# Patient Record
Sex: MALE | Race: WHITE | Employment: UNEMPLOYED | URBAN - METROPOLITAN AREA
[De-identification: names, ages, dates, MRNs, and addresses within clinical notes are randomized per-mention and may not be internally consistent; named-entity substitution may affect disease eponyms.]

---
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
## 2022-11-12 ENCOUNTER — HOSPITAL ENCOUNTER (EMERGENCY)
Age: 22
Discharge: HOME | End: 2022-11-12
Attending: EMERGENCY MEDICINE
Payer: MEDICARE

## 2022-11-12 VITALS
RESPIRATION RATE: 18 BRPM | DIASTOLIC BLOOD PRESSURE: 87 MMHG | SYSTOLIC BLOOD PRESSURE: 149 MMHG | OXYGEN SATURATION: 97 % | HEIGHT: 75 IN | HEART RATE: 66 BPM | WEIGHT: 175 LBS | TEMPERATURE: 98.4 F | BODY MASS INDEX: 21.76 KG/M2

## 2022-11-12 DIAGNOSIS — J06.9 VIRAL URI WITH COUGH: Primary | ICD-10-CM

## 2022-11-12 PROCEDURE — 99283 EMERGENCY DEPT VISIT LOW MDM: CPT

## 2022-11-12 RX ORDER — BENZONATATE 100 MG/1
100 CAPSULE ORAL 3 TIMES DAILY PRN
Qty: 30 CAPSULE | Refills: 0 | Status: SHIPPED | OUTPATIENT
Start: 2022-11-12 | End: 2022-11-19

## 2022-11-12 ASSESSMENT — ENCOUNTER SYMPTOMS
FACIAL SWELLING: 0
EYE PAIN: 0
ABDOMINAL PAIN: 0
SHORTNESS OF BREATH: 0
NAUSEA: 0
BACK PAIN: 0
COUGH: 1
CHEST TIGHTNESS: 0
VOMITING: 0
EYE REDNESS: 0

## 2022-11-12 ASSESSMENT — PAIN - FUNCTIONAL ASSESSMENT: PAIN_FUNCTIONAL_ASSESSMENT: NONE - DENIES PAIN

## 2022-11-12 NOTE — ED PROVIDER NOTES
[facility]  ED  Emergency Department Encounter  Emergency Medicine Resident     Pt Chente Rodriguez  MRN: [id number]  [place] 2000  Date of evaluation: 11/12/22  PCP:  No primary care provider on file. CHIEF COMPLAINT       Chief Complaint   Patient presents with    Cough    Nasal Congestion     X1 week       HISTORY OF PRESENT ILLNESS  (Location/Symptom, Timing/Onset, Context/Setting, Quality, Duration, Modifying Factors, Severity.)      Jacinta Orourke is a 25 y.o. male who presents with cough, nasal congestion. Patient denies fever chills, abdominal pain, nausea or vomiting headaches. States that his symptoms started 1 week ago. Patient denies any sick contacts. Patient is concerned that he might have COVID-19 or pneumonia. Patient denies having COVID-vaccine or boosters. Patient states that he did have a negative COVID test at home. Patient states that his symptoms have been constant. Patient states that his symptoms are better when he takes DayQuil and NyQuil worse when he does not. PAST MEDICAL / SURGICAL / SOCIAL / FAMILY HISTORY      has a past medical history of Seasonal allergies. has no past surgical history on file.       Social History     Socioeconomic History    Marital status: Single     Spouse name: Not on file    Number of children: Not on file    Years of education: Not on file    Highest education level: Not on file   Occupational History    Not on file   Tobacco Use    Smoking status: Not on file    Smokeless tobacco: Not on file   Substance and Sexual Activity    Alcohol use: Not on file    Drug use: Not on file    Sexual activity: Not on file   Other Topics Concern    Not on file   Social History Narrative    Not on file     Social Determinants of Health     Financial Resource Strain: Not on file   Food Insecurity: Not on file   Transportation Needs: Not on file   Physical Activity: Not on file   Stress: Not on file   Social Connections: Not on file Intimate Partner Violence: Not on file   Housing Stability: Not on file       No family history on file. Allergies:  Clindamycin/lincomycin    Home Medications:  Prior to Admission medications    Not on File       REVIEW OF SYSTEMS    (2-9 systems for level 4, 10 or more for level 5)      Review of Systems   Constitutional:  Negative for chills, diaphoresis and fatigue. HENT:  Positive for congestion. Negative for dental problem, facial swelling, nosebleeds and tinnitus. Eyes:  Negative for pain, redness and visual disturbance. Respiratory:  Positive for cough. Negative for chest tightness and shortness of breath. Cardiovascular:  Negative for chest pain, palpitations and leg swelling. Gastrointestinal:  Negative for abdominal pain, nausea and vomiting. Genitourinary:  Negative for flank pain, hematuria, penile pain, scrotal swelling and testicular pain. Musculoskeletal:  Negative for back pain, neck pain and neck stiffness. Skin:  Negative for pallor, rash and wound. Neurological:  Negative for syncope, facial asymmetry, weakness, numbness and headaches. PHYSICAL EXAM   (up to 7 for level 4, 8 or more for level 5)      INITIAL VITALS:   BP (!) 153/94   Pulse 73   Temp 98.4 °F (36.9 °C) (Oral)   Resp 18   SpO2 97%     Physical Exam  Constitutional:       Appearance: Normal appearance. He is not ill-appearing or diaphoretic. HENT:      Head: Normocephalic and atraumatic. Eyes:      Extraocular Movements: Extraocular movements intact. Pupils: Pupils are equal, round, and reactive to light. Cardiovascular:      Rate and Rhythm: Normal rate and regular rhythm. Pulses: Normal pulses. Heart sounds: Normal heart sounds. No murmur heard. No friction rub. No gallop. Pulmonary:      Effort: Pulmonary effort is normal.      Breath sounds: Normal breath sounds. No stridor. No rhonchi or rales. Abdominal:      General: Bowel sounds are normal. There is no distension. Palpations: Abdomen is soft. Tenderness: There is no abdominal tenderness. There is no guarding or rebound. Hernia: No hernia is present. Musculoskeletal:         General: No swelling, tenderness, deformity or signs of injury. Normal range of motion. Cervical back: Normal range of motion and neck supple. No tenderness. Skin:     General: Skin is warm and dry. Neurological:      General: No focal deficit present. Mental Status: He is alert and oriented to person, place, and time. Cranial Nerves: No cranial nerve deficit. Sensory: No sensory deficit. Motor: No weakness. Psychiatric:         Mood and Affect: Mood normal.       DIFFERENTIAL  DIAGNOSIS     PLAN (LABS / IMAGING / EKG):  No orders of the defined types were placed in this encounter. MEDICATIONS ORDERED:  No orders of the defined types were placed in this encounter. DDX:   COVID-19, flu, viral syndrome    DIAGNOSTIC RESULTS / EMERGENCY DEPARTMENT COURSE / MDM   LAB RESULTS:  No results found for this visit on 11/12/22. IMPRESSION:   Is a 69-year-old male comes in for cough and nasal congestion. Patient well-appearing on exam nontoxic. Patient's oxygen saturation 97% heart rate is 73. Patient is concerned for possible COVID-19 infection or pneumonia. On exam patient's lungs are clear. Discussed with patient that his treatment would not change if he is positive. Patient is agreement with discharge home. RADIOLOGY:  No orders to display         EKG      All EKG's are interpreted by the Emergency Department Physician who either signs or Co-signs this chart in the absence of a cardiologist.    [address] [place] [address] [place]:           No notes of  Admission Criteria type on file. PROCEDURES:      CONSULTS:  None    CRITICAL CARE:      FINAL IMPRESSION      No diagnosis found. DISPOSITION / PLAN     DISPOSITION        PATIENT REFERRED TO:  No follow-up provider specified.     DISCHARGE MEDICATIONS:  New Prescriptions    No medications on file       Roxi Rothman DO  Emergency Medicine Resident    (Please note that portions of thisnote were completed with a voice recognition program.  Efforts were made to edit the dictations but occasionally words are mis-transcribed.)       Roxi Rothman DO  Resident  11/12/22 5786

## 2022-11-12 NOTE — ED PROVIDER NOTES
[facility] [address] [facility]     Emergency Department     Faculty Attestation    I performed a history and physical examination of the patient and discussed management with the resident. I reviewed the residents note and agree with the documented findings and plan of care. Any areas of disagreement are noted on the chart. I was personally present for the key portions of any procedures. I have documented in the chart those procedures where I was not present during the key portions. I have reviewed the emergency nurses triage note. I agree with the chief complaint, past medical history, past surgical history, allergies, medications, social and family history as documented unless otherwise noted below. For Physician Assistant/ Nurse Practitioner cases/documentation I have personally evaluated this patient and have completed at least one if not all key elements of the E/M (history, physical exam, and MDM). Additional findings are as noted. URI cough congestion for the last week. No known sick contacts. No fevers no vomiting no diarrhea. On exam nontoxic well-appearing not hypoxic speaking full sentences lungs clear and equal throughout heart regular.   URI will discharge      Critical Care     none    Chris Little MD, Micheal Fritz  Attending Emergency  Physician           Chris Little MD  11/12/22 [phone]

## 2022-11-12 NOTE — ED NOTES
Pt to ED complaining of cough and nasal congestion for about a weel now. Pt states his been taking OTC medication but it's not helping him at all. VS taken and recorded. Denies any medical history. All needs attended. Call light w/in reach. Will continue plan of care.      Warden Bailey, HO  11/12/22 [id number]

## 2022-12-07 ENCOUNTER — APPOINTMENT (OUTPATIENT)
Dept: GENERAL RADIOLOGY | Age: 22
End: 2022-12-07
Payer: MEDICARE

## 2022-12-07 ENCOUNTER — HOSPITAL ENCOUNTER (EMERGENCY)
Age: 22
Discharge: HOME | End: 2022-12-07
Attending: EMERGENCY MEDICINE
Payer: MEDICARE

## 2022-12-07 VITALS
TEMPERATURE: 97.4 F | DIASTOLIC BLOOD PRESSURE: 100 MMHG | OXYGEN SATURATION: 100 % | HEART RATE: 76 BPM | RESPIRATION RATE: 17 BRPM | SYSTOLIC BLOOD PRESSURE: 159 MMHG | BODY MASS INDEX: 21.87 KG/M2 | WEIGHT: 175 LBS

## 2022-12-07 DIAGNOSIS — S69.90XA JAMMED FINGER (INTERPHALANGEAL JOINT), INITIAL ENCOUNTER: Primary | ICD-10-CM

## 2022-12-07 PROCEDURE — 6370000000 HC RX 637 (ALT 250 FOR IP): Performed by: STUDENT IN AN ORGANIZED HEALTH CARE EDUCATION/TRAINING PROGRAM

## 2022-12-07 PROCEDURE — 99283 EMERGENCY DEPT VISIT LOW MDM: CPT

## 2022-12-07 PROCEDURE — 73130 X-RAY EXAM OF HAND: CPT

## 2022-12-07 RX ORDER — IBUPROFEN 800 MG/1
800 TABLET ORAL ONCE
Status: COMPLETED | OUTPATIENT
Start: 2022-12-07 | End: 2022-12-07

## 2022-12-07 RX ADMIN — IBUPROFEN 800 MG: 800 TABLET, FILM COATED ORAL at 17:32

## 2022-12-07 ASSESSMENT — PAIN SCALES - GENERAL: PAINLEVEL_OUTOF10: 8

## 2022-12-07 ASSESSMENT — ENCOUNTER SYMPTOMS
COUGH: 0
VOICE CHANGE: 0
TROUBLE SWALLOWING: 0
EYES NEGATIVE: 1

## 2022-12-07 ASSESSMENT — PAIN - FUNCTIONAL ASSESSMENT: PAIN_FUNCTIONAL_ASSESSMENT: 0-10

## 2022-12-07 NOTE — ED NOTES
PT to ER for c/o L pointer finger swelling and pain for 3 weeks. Pt states he was playing basketball and had the basketball jammed into his finger a \"few times. \" Pt states that he has been taking motrin for the pain but states he did not take anything today. Pt states he has been avoiding coming to the ER because he thought it would heal on its own but the swelling and pain has not gone away. Pt thinks he has a finger dislocation. Pt noted to have swelling in the L pointer finger with limited movement. No acute distress noted, RR even and NL. Dr. Pedro Fears at bedside to evaluate pt.       Gus Sexton RN  12/07/22 8421

## 2022-12-07 NOTE — DISCHARGE INSTRUCTIONS
Thank you for visiting [facility] Emergency Department. You need to call No primary care provider on file. to make an appointment as directed for follow up. Should you have any questions regarding your care or further treatment, please call [facility] Emergency Department at [phone].

## 2022-12-07 NOTE — ED PROVIDER NOTES
[facility]  ED  Emergency Department Encounter  Emergency Medicine Resident     Pt Name: Nu Khalil  AAM:[id number]  [place] 2000  Date of evaluation: 12/7/22  PCP:  No primary care provider on file. CHIEF COMPLAINT       Chief Complaint   Patient presents with    Finger Pain     L pointer finger        HISTORY OF PRESENT ILLNESS  (Location/Symptom, Timing/Onset, Context/Setting, Quality, Duration, ModifyingFactors, Severity.)      Nu Khalil is a 25 y.o. male presents to the emerge apartment for evaluation of left pointer finger injury. Patient was playing basketball and jammed multiple times while playing. He states that he has different with flexion of his finger however extension is maintained. Patient states that his pain is a 7 out of 10 but it comes and 8 out of 10 when he tries to move it. Patient denies any popping or bending noises. Patient is right-hand dominant this is on his left hand. Patient states is never broken a bone before. PAST MEDICAL / SURGICAL / SOCIAL /FAMILY HISTORY      has a past medical history of Seasonal allergies. No other pertinent PMH on review with patient/guardian. has no past surgical history on file. No other pertinent PSH on review with patient/guardian.   Social History     Socioeconomic History    Marital status: Single     Spouse name: Not on file    Number of children: Not on file    Years of education: Not on file    Highest education level: Not on file   Occupational History    Not on file   Tobacco Use    Smoking status: Not on file    Smokeless tobacco: Not on file   Substance and Sexual Activity    Alcohol use: Not on file    Drug use: Not on file    Sexual activity: Not on file   Other Topics Concern    Not on file   Social History Narrative    Not on file     Social Determinants of Health     Financial Resource Strain: Not on file   Food Insecurity: Not on file   Transportation Needs: Not on file   Physical Activity: Not on file   Stress: Not on file   Social Connections: Not on file   Intimate Partner Violence: Not on file   Housing Stability: Not on file       I counseled the patient against using tobacco products. History reviewed. No pertinent family history. No other pertinent FamHx on review with patient/guardian. Allergies:  Clindamycin/lincomycin    Home Medications:  Prior to Admission medications    Not on File       REVIEW OF SYSTEMS    (2-9 systems for level 4, 10 ormore for level 5)      Review of Systems   Constitutional:  Negative for activity change, appetite change and fever. HENT:  Negative for congestion, trouble swallowing and voice change. Eyes: Negative. Respiratory:  Negative for cough. Musculoskeletal:  Positive for arthralgias. Negative for myalgias. Allergic/Immunologic: Negative for immunocompromised state. Neurological:  Negative for dizziness and headaches. Psychiatric/Behavioral:  Negative for agitation, behavioral problems and confusion. PHYSICAL EXAM   (up to 7 for level 4, 8 or more for level 5)      INITIAL VITALS:   BP (!) 159/100   Pulse 76   Temp 97.4 °F (36.3 °C) (Oral)   Resp 17   Wt 175 lb (79.4 kg)   SpO2 100%   BMI 21.87 kg/m²     Physical Exam  Vitals reviewed. Constitutional:       Appearance: Normal appearance. HENT:      Head: Normocephalic and atraumatic. Nose: Nose normal.      Mouth/Throat:      Mouth: Mucous membranes are moist.      Pharynx: Oropharynx is clear. Eyes:      Extraocular Movements: Extraocular movements intact. Conjunctiva/sclera: Conjunctivae normal.      Pupils: Pupils are equal, round, and reactive to light. Cardiovascular:      Rate and Rhythm: Normal rate and regular rhythm. Pulses: Normal pulses. Heart sounds: Normal heart sounds. Pulmonary:      Effort: Pulmonary effort is normal.      Breath sounds: Normal breath sounds.    Musculoskeletal:      Cervical back: Normal range of motion and neck supple. Comments: Moderately swollen and tender left pointer finger. Range of motion maintained save for difficulty with full flexion secondary to swelling. Skin:     General: Skin is warm and dry. Capillary Refill: Capillary refill takes less than 2 seconds. Neurological:      General: No focal deficit present. Mental Status: He is alert. Mental status is at baseline. Psychiatric:         Mood and Affect: Mood normal.       DIFFERENTIAL  DIAGNOSIS     DDX: Jammed finger, sprain finger, finger fracture    PLAN (LABS / IMAGING / EKG):  Orders Placed This Encounter   Procedures    XR HAND LEFT (MIN 3 VIEWS)       MEDICATIONS ORDERED:  Orders Placed This Encounter   Medications    ibuprofen (ADVIL;MOTRIN) tablet 800 mg           DIAGNOSTIC RESULTS / EMERGENCY DEPARTMENT COURSE / MDM     LABS:  No results found for this visit on 12/07/22. IMPRESSION/MDM/ED COURSE:  25 y.o. male presented with possible finger fracture secondary to a basketball hitting the tip. Will get x-rays of finger to rule out mallet fracture versus dislocation. Will pain control with Motrin and reevaluate after scans are returned. ED Course as of 12/07/22 1854   Wed Dec 07, 2022   1853 Patient x-rays are negative. Swelling is coming down appropriately. Pain controlled with Motrin. Will discharge at this time. [ES]      ED Course User Index  [ES] Conrad Whiting MD       Patient/Guardian requesting discharge. Patient/Guardian was given written and verbal instructions prior to discharge. Patient/Guardian understood and agreed. Patient/Guardian had no further questions. RADIOLOGY:  XR HAND LEFT (MIN 3 VIEWS)   Final Result   Soft tissue swelling of the 2nd digit with no evident fracture or   dislocation. Follow-up radiographs could be considered if there are   persistent symptoms.                EKG  None    All EKG's are interpreted by the Emergency Department Physician who either signs or Co-signs this chart in the absence of a cardiologist.      PROCEDURES:  None    CONSULTS:  None        FINAL IMPRESSION      1.  Jammed finger (interphalangeal joint), initial encounter          DISPOSITION / PLAN       DISPOSITION Decision To Discharge 12/07/2022 06:53:52 PM        PATIENT REFERREDTO:  [facility]  [address] [place] [address]  [phone]  Go to   If symptoms worsen    DISCHARGE MEDICATIONS:  New Prescriptions    No medications on file       Delaney Stuart MD  PGY 3  Resident Physician Emergency Medicine  12/07/22 6:54 PM        (Please note that portions of this note were completed with a voice recognition program.Efforts were made to edit the dictations but occasionally words are mis-transcribed.)       Delaney Stuart MD  Resident  12/07/22 3144

## 2022-12-09 NOTE — ED PROVIDER NOTES
[facility]     Emergency Department     Faculty Attestation    I performed a history and physical examination of the patient and discussed management with the resident. I reviewed the residents note and agree with the documented findings and plan of care. Any areas of disagreement are noted on the chart. I was personally present for the key portions of any procedures. I have documented in the chart those procedures where I was not present during the key portions. I have reviewed the emergency nurses triage note. I agree with the chief complaint, past medical history, past surgical history, allergies, medications, social and family history as documented unless otherwise noted below. For Physician Assistant/ Nurse Practitioner cases/documentation I have personally evaluated this patient and have completed at least one if not all key elements of the E/M (history, physical exam, and MDM). Additional findings are as noted. I have personally seen and evaluated the patient. I find the patient's history and physical exam are consistent with the NP/PA documentation. I agree with the care provided, treatment rendered, disposition and follow-up plan. Critical Care     Shari Warner M.D.   Attending Emergency  Physician            Seema Marquez MD  12/09/22 3409

## 2023-01-30 ENCOUNTER — HOSPITAL ENCOUNTER (EMERGENCY)
Age: 23
Discharge: HOME | End: 2023-01-30
Attending: EMERGENCY MEDICINE
Payer: MEDICARE

## 2023-01-30 ENCOUNTER — APPOINTMENT (OUTPATIENT)
Dept: GENERAL RADIOLOGY | Age: 23
End: 2023-01-30
Payer: MEDICARE

## 2023-01-30 VITALS
DIASTOLIC BLOOD PRESSURE: 91 MMHG | RESPIRATION RATE: 18 BRPM | HEART RATE: 82 BPM | BODY MASS INDEX: 21.14 KG/M2 | SYSTOLIC BLOOD PRESSURE: 162 MMHG | WEIGHT: 170 LBS | HEIGHT: 75 IN | TEMPERATURE: 97.2 F | OXYGEN SATURATION: 99 %

## 2023-01-30 DIAGNOSIS — M25.442 FINGER JOINT SWELLING, LEFT: Primary | ICD-10-CM

## 2023-01-30 DIAGNOSIS — M25.572 ACUTE LEFT ANKLE PAIN: ICD-10-CM

## 2023-01-30 PROCEDURE — 73610 X-RAY EXAM OF ANKLE: CPT

## 2023-01-30 PROCEDURE — 73130 X-RAY EXAM OF HAND: CPT

## 2023-01-30 PROCEDURE — 6370000000 HC RX 637 (ALT 250 FOR IP)

## 2023-01-30 PROCEDURE — 99283 EMERGENCY DEPT VISIT LOW MDM: CPT

## 2023-01-30 RX ORDER — IBUPROFEN 800 MG/1
800 TABLET ORAL 2 TIMES DAILY PRN
Qty: 15 TABLET | Refills: 0 | Status: SHIPPED | OUTPATIENT
Start: 2023-01-30

## 2023-01-30 RX ORDER — ACETAMINOPHEN 500 MG
1000 TABLET ORAL ONCE
Status: COMPLETED | OUTPATIENT
Start: 2023-01-30 | End: 2023-01-30

## 2023-01-30 RX ADMIN — ACETAMINOPHEN 1000 MG: 500 TABLET ORAL at 20:57

## 2023-01-30 ASSESSMENT — PAIN SCALES - GENERAL: PAINLEVEL_OUTOF10: 6

## 2023-01-30 ASSESSMENT — PAIN - FUNCTIONAL ASSESSMENT: PAIN_FUNCTIONAL_ASSESSMENT: 0-10

## 2023-01-30 ASSESSMENT — PAIN DESCRIPTION - LOCATION: LOCATION: ANKLE

## 2023-01-30 ASSESSMENT — PAIN DESCRIPTION - DESCRIPTORS: DESCRIPTORS: SHARP;ACHING

## 2023-01-30 ASSESSMENT — PAIN DESCRIPTION - ORIENTATION: ORIENTATION: LEFT

## 2023-01-30 NOTE — Clinical Note
Lubna Cleary was seen and treated in our emergency department on 1/30/2023. He may return to work on 01/31/2023. If you have any questions or concerns, please don't hesitate to call.       Leyla Gomez MD

## 2023-01-30 NOTE — Clinical Note
Catherine Carlson was seen and treated in our emergency department on 1/30/2023. He may return to work on . If you have any questions or concerns, please don't hesitate to call.       Shania Ferrell MD

## 2023-01-31 NOTE — ED PROVIDER NOTES
[address] [facility]     Emergency Department     Faculty Attestation    I performed a history and physical examination of the patient and discussed management with the resident. I reviewed the residents note and agree with the documented findings and plan of care. Any areas of disagreement are noted on the chart. I was personally present for the key portions of any procedures. I have documented in the chart those procedures where I was not present during the key portions. I have reviewed the emergency nurses triage note. I agree with the chief complaint, past medical history, past surgical history, allergies, medications, social and family history as documented unless otherwise noted below. For Physician Assistant/ Nurse Practitioner cases/documentation I have personally evaluated this patient and have completed at least one if not all key elements of the E/M (history, physical exam, and MDM). Additional findings are as noted. Patient here with 2 orthopedic complaints both from basketball. 1.  Persistent pain in his right index finger. Seen for same 6 weeks ago negative x-rays at that time but swelling present recommended repeat x-rays with persistent symptoms. No new injury. Patient has swelling pain at the left index finger PIP but no deformity full range of motion despite discomfort, There is full strength for flexion and extension against full resistance at the MCP, PIP, and the DIP in the affected digit. Will image. 2.  Left ankle pain basketball injury still ambulatory.   Lateral pain and swelling on exam no proximal fibula tenderness no open injury strong pulses will image        Critical Care     none    Willean Favre, MD, Janeth Bateman  Attending Emergency  Physician            Willean Favre, MD  01/30/23 5300

## 2023-01-31 NOTE — ED PROVIDER NOTES
[facility]  ED  Emergency Department Encounter  Emergency Medicine Resident     Pt Martine Hernandez  MRN: [id number]  [place] 2000  Date of evaluation: 1/30/23  PCP:  No primary care provider on file. Note Started: 9:35 PM EST      CHIEF COMPLAINT       Chief Complaint   Patient presents with    Ankle Pain     Left ankle 1 month ago     Finger Pain     Left hand   started x2 months ago     HISTORY OF PRESENT ILLNESS  (Location/Symptom, Timing/Onset, Context/Setting, Quality, Duration, Modifying Factors, Severity.)      Elier Waters is a 25 y.o. male who presents with left middle finger pain and left ankle pain. Patient was evaluated in the emergency department for his left middle finger back in December. He injured it playing basketball. X-ray at that time did not show any acute fracture. Patient currently denies any pain in his left finger. However, the swelling has never gone down. Patient also has left ankle pain. He twisted it about a month ago playing basketball. He was elevating it and applying ice but the swelling has not improved he works 6 to 8 hours on his feet and has been having pain when standing. PAST MEDICAL / SURGICAL / SOCIAL / FAMILY HISTORY      has a past medical history of Seasonal allergies. Reviewed with patient. has no past surgical history on file. Reviewed with patient.     Social History     Socioeconomic History    Marital status: Single     Spouse name: Not on file    Number of children: Not on file    Years of education: Not on file    Highest education level: Not on file   Occupational History    Not on file   Tobacco Use    Smoking status: Not on file    Smokeless tobacco: Not on file   Substance and Sexual Activity    Alcohol use: Not on file    Drug use: Not on file    Sexual activity: Not on file   Other Topics Concern    Not on file   Social History Narrative    Not on file     Social Determinants of Health     Financial Resource Strain: Not on file   Food Insecurity: Not on file   Transportation Needs: Not on file   Physical Activity: Not on file   Stress: Not on file   Social Connections: Not on file   Intimate Partner Violence: Not on file   Housing Stability: Not on file       No family history on file.    Allergies:  Clindamycin/lincomycin    Home Medications:  Prior to Admission medications    Not on File       REVIEW OF SYSTEMS       Review of Systems   Musculoskeletal:  Positive for joint swelling. Negative for gait problem.        Left middle finger swelling.  Left ankle pain and swelling.   Skin:  Negative for wound.   Neurological:  Negative for weakness and numbness.   Hematological:  Does not bruise/bleed easily.     PHYSICAL EXAM      INITIAL VITALS:   BP (!) 162/91   Pulse 82   Temp 97.2 °F (36.2 °C) (Oral)   Resp 18   Ht 6' 3\" (1.905 m)   Wt 170 lb (77.1 kg)   SpO2 99%   BMI 21.25 kg/m²     Physical Exam  Constitutional:       General: He is not in acute distress.  HENT:      Head: Normocephalic and atraumatic.      Right Ear: External ear normal.      Left Ear: External ear normal.      Nose: Nose normal.      Mouth/Throat:      Mouth: Mucous membranes are moist.   Eyes:      Extraocular Movements: Extraocular movements intact.      Conjunctiva/sclera: Conjunctivae normal.   Cardiovascular:      Rate and Rhythm: Normal rate.      Pulses: Normal pulses.      Comments: DP pulses 2+ bilaterally.  Pulmonary:      Effort: Pulmonary effort is normal.   Musculoskeletal:         General: Swelling and tenderness present. Normal range of motion.      Cervical back: Normal range of motion.      Comments: Swelling noted to the left PIP joint of the middle finger.  No tenderness to palpation.  Full range of motion of the left hand.  Swelling noted to the left ankle.  Tenderness to the lateral malleolus.  No decreased range of motion of the left ankle.   Skin:     Findings: No bruising, erythema, lesion or rash.   Neurological:       General: No focal deficit present. Mental Status: He is alert and oriented to person, place, and time. Cranial Nerves: No cranial nerve deficit. Sensory: No sensory deficit. Motor: No weakness. Coordination: Coordination normal.      Gait: Gait normal.     DDX/DIAGNOSTIC RESULTS / EMERGENCY DEPARTMENT COURSE / MDM     Medical Decision Making  71-year-old male who presents with swelling to his left middle finger and pain and swelling to his left ankle. Patient injured his left middle finger back in December playing basketball. X-ray at that time showed no acute fracture. However, the swelling has not improved. Patient twisted his ankle playing basketball about a month ago. Patient still has swelling and pain when standing on the left ankle. Patient is not in acute distress. Vital signs stable. Swelling noted to the PIP joint of the left middle finger. No tenderness to palpation. Full range of motion of the left hand. No sensation deficit. Swelling noted to the lateral aspect of the left ankle. Tenderness to the lateral malleolus. No decreased range of motion of the left ankle. No sensation deficit. Strength 5/5 in bilateral lower extremities. DP pulses 2+ bilaterally. Patient able to ambulate. Will order x-ray of left hand and left ankle. Pain control. Anticipate discharge. Amount and/or Complexity of Data Reviewed  External Data Reviewed: radiology and notes. Radiology: ordered. Decision-making details documented in ED Course. Risk  OTC drugs. Critical Care  Total time providing critical care: < 30 minutes    EMERGENCY DEPARTMENT COURSE:  ED Course as of 01/30/23 2154 Mon Jan 30, 2023 2153 Signed out to Dr. Christiane James. [KR]      ED Course User Index  [KR] Nurys Gillespie MD       CONSULTS:  None    CRITICAL CARE:  There was significant risk of life threatening deterioration of patient's condition requiring my direct management.  Critical care time 10 minutes, excluding any documented procedures. FINAL IMPRESSION      1. Finger joint swelling, left    2. Acute left ankle pain          DISPOSITION / PLAN     DISPOSITION        PATIENT REFERRED TO:  No follow-up provider specified.     DISCHARGE MEDICATIONS:  New Prescriptions    No medications on file       Sari Chong MD  Emergency Medicine Resident    (Please note that portions of thisnote were completed with a voice recognition program.  Efforts were made to edit the dictations but occasionally words are mis-transcribed.)       Sari Chong MD  Resident  01/30/23 5742

## 2023-01-31 NOTE — ED PROVIDER NOTES
[facility] ED  Emergency Department  Emergency Medicine Resident Sign-out     Care of Kobe Zavala was assumed from Dr. Souza and is being seen for Ankle Pain (Left ankle 1 month ago ) and Finger Pain (Left hand   started x2 months ago)  .  The patient's initial evaluation and plan have been discussed with the prior provider who initially evaluated the patient.     EMERGENCY DEPARTMENT COURSE / MEDICAL DECISION MAKING:       MEDICATIONS GIVEN:  Orders Placed This Encounter   Medications    acetaminophen (TYLENOL) tablet 1,000 mg    ibuprofen (ADVIL;MOTRIN) 800 MG tablet     Sig: Take 1 tablet by mouth 2 times daily as needed for Pain     Dispense:  15 tablet     Refill:  0       LABS / RADIOLOGY:     Labs Reviewed - No data to display    No results found.    RECENT VITALS:     Temp: 97.2 °F (36.2 °C),  Heart Rate: 82, Resp: 18, BP: (!) 162/91, SpO2: 99 %      This patient is a 22 y.o. Male with hand pain and ankle pain after injury.  Patient with swelling over the hand.  Plain films ordered.        ED Course as of 01/31/23 0146 Mon Jan 30, 2023 2153 Signed out to Dr. Morales. [KR]   2210 XR reviewed my me, no acute fractures noted. Still awaiting for read by radiologist [AN]   2300 XR showed Worsening soft tissue swelling along the ulnar aspect of 2nd digit PIP joint  with no radiopaque foreign body.  Finding may be related to tendon/ligamentous injury.  For further evaluation MRI can be performed [AN]      ED Course User Index  [AN] Marylu Morales MD  [KR] Amarjit Souza MD       OUTSTANDING TASKS / RECOMMENDATIONS:         FINAL IMPRESSION:     1. Finger joint swelling, left    2. Acute left ankle pain        DISPOSITION:         DISPOSITION:  [x]  Discharge   []  Transfer -    []  Admission -     []  Against Medical Advice   []  Eloped   FOLLOW-UP: [facility] ED  [address] [address]  [phone]    If symptoms worsen    Cornell Dejesus DO  [address]269  [address] [place] [address]  [phone]      Please call to follow up   DISCHARGE MEDICATIONS: Discharge Medication List as of 1/30/2023 11:12 PM        START taking these medications    Details   ibuprofen (ADVIL;MOTRIN) 800 MG tablet Take 1 tablet by mouth 2 times daily as needed for Pain, Disp-15 tablet, R-0Print                Assumpta Eros Avelar MD  Emergency Medicine Resident  [facility]        Joao Carreno MD  Resident  01/31/23 6961

## 2023-01-31 NOTE — DISCHARGE INSTRUCTIONS
Due to concern for ligament/tendon injury, you will need to follow-up with orthopedic surgeon for them to schedule for an MRI. If it anytime you do have worsening symptoms pain, swelling, redness, please return to emergency room as soon as possible. XR HAND LEFT (MIN 3 VIEWS)   Final Result   Left ankle: No acute osseous abnormality. Left hand: No acute osseous abnormality. Worsening soft tissue swelling along the ulnar aspect of 2nd digit PIP joint   with no radiopaque foreign body. Finding may be related to   tendon/ligamentous injury. For further evaluation MRI can be performed. XR ANKLE LEFT (MIN 3 VIEWS)   Final Result   Left ankle: No acute osseous abnormality. Left hand: No acute osseous abnormality. Worsening soft tissue swelling along the ulnar aspect of 2nd digit PIP joint   with no radiopaque foreign body. Finding may be related to   tendon/ligamentous injury. For further evaluation MRI can be performed.

## 2023-01-31 NOTE — ED NOTES
Pt came to ED via triage w complaint of L ankle pain/ swelling over 1 month, L finger swelling over 2 months. Was tx here for finger pain/swelling over 2 months ago but hasnt gotten better. Injured both while playing basketball. Has continued to play basketball like normal after injuries, pt admits not letting ankle and finger heal. No meds taken PTA. Pt denies medical hx related to condition. VSS, NAD, AOx4.  Pt resting in bed with call light in reach no verbalized needs at this time        Queta Sagastume RN  01/30/23 2032

## 2023-01-31 NOTE — PROGRESS NOTES
[facility] [address]  PROGRESS NOTE    Shift date: 1.30.2023  Shift day: Monday   Shift # 2    Room # 11/11   Name: Mt Skelton                Anabaptism: unknown   Place of Hindu: unknown    Referral: Routine Visit    Admit Date & Time: 1/30/2023  8:24 PM    Assessment:  Mt Skelton is a 25 y.o. male in the hospital because for pain. Upon entering the room writer observes patient walking around, calm, and coping. Patient states that family is aware that he is in the hospital and has support. Intervention:  Writer introduced self and title as  Writer offered space for the patient  to express feelings, needs, and concerns and provided a ministry presence. Outcome:  Patient appears to be calm and coping    Plan:  Chaplains will remain available to offer spiritual and emotional support as needed.       Electronically signed by Ruben Stanton on 1/30/2023 at 11:06 PM.  [facility]  [phone]       01/30/23 2250   Encounter Summary   Service Provided For: Patient   Referral/Consult From: [facility] Family members   Last Encounter  01/30/23   Complexity of Encounter Low   Begin Time 2250   End Time  2255   Total Time Calculated 5 min   Encounter    Type Initial Screen/Assessment   Assessment/Intervention/Outcome   Assessment Calm;Coping   Intervention Active listening;Explored/Affirmed feelings, thoughts, concerns   Outcome Coping     Electronically signed by Kevyn Jolly on 1/30/2023 at 11:06 PM

## 2023-02-06 ENCOUNTER — HOSPITAL ENCOUNTER (EMERGENCY)
Age: 23
Discharge: HOME | End: 2023-02-06
Attending: EMERGENCY MEDICINE
Payer: COMMERCIAL

## 2023-02-06 VITALS
OXYGEN SATURATION: 99 % | HEART RATE: 85 BPM | DIASTOLIC BLOOD PRESSURE: 87 MMHG | SYSTOLIC BLOOD PRESSURE: 147 MMHG | RESPIRATION RATE: 18 BRPM | TEMPERATURE: 98.2 F | BODY MASS INDEX: 21.76 KG/M2 | WEIGHT: 175 LBS | HEIGHT: 75 IN

## 2023-02-06 DIAGNOSIS — S31.119A LACERATION OF ABDOMEN, INITIAL ENCOUNTER: Primary | ICD-10-CM

## 2023-02-06 PROCEDURE — 90471 IMMUNIZATION ADMIN: CPT | Performed by: EMERGENCY MEDICINE

## 2023-02-06 PROCEDURE — 90715 TDAP VACCINE 7 YRS/> IM: CPT | Performed by: EMERGENCY MEDICINE

## 2023-02-06 PROCEDURE — 6360000002 HC RX W HCPCS: Performed by: EMERGENCY MEDICINE

## 2023-02-06 PROCEDURE — 99284 EMERGENCY DEPT VISIT MOD MDM: CPT

## 2023-02-06 PROCEDURE — 12002 RPR S/N/AX/GEN/TRNK2.6-7.5CM: CPT

## 2023-02-06 PROCEDURE — 96372 THER/PROPH/DIAG INJ SC/IM: CPT

## 2023-02-06 RX ORDER — KETOROLAC TROMETHAMINE 30 MG/ML
30 INJECTION, SOLUTION INTRAMUSCULAR; INTRAVENOUS ONCE
Status: COMPLETED | OUTPATIENT
Start: 2023-02-06 | End: 2023-02-06

## 2023-02-06 RX ADMIN — TETANUS TOXOID, REDUCED DIPHTHERIA TOXOID AND ACELLULAR PERTUSSIS VACCINE, ADSORBED 0.5 ML: 5; 2.5; 8; 8; 2.5 SUSPENSION INTRAMUSCULAR at 22:42

## 2023-02-06 RX ADMIN — KETOROLAC TROMETHAMINE 30 MG: 30 INJECTION, SOLUTION INTRAMUSCULAR; INTRAVENOUS at 22:41

## 2023-02-06 ASSESSMENT — PAIN DESCRIPTION - ORIENTATION: ORIENTATION: LEFT;LOWER

## 2023-02-06 ASSESSMENT — PAIN - FUNCTIONAL ASSESSMENT
PAIN_FUNCTIONAL_ASSESSMENT: NONE - DENIES PAIN
PAIN_FUNCTIONAL_ASSESSMENT: 0-10

## 2023-02-06 ASSESSMENT — ENCOUNTER SYMPTOMS
NAUSEA: 0
VOMITING: 0
ABDOMINAL PAIN: 1
BACK PAIN: 0

## 2023-02-06 ASSESSMENT — PAIN DESCRIPTION - LOCATION: LOCATION: ABDOMEN

## 2023-02-06 ASSESSMENT — PAIN SCALES - GENERAL: PAINLEVEL_OUTOF10: 8

## 2023-02-07 ENCOUNTER — HOSPITAL ENCOUNTER (EMERGENCY)
Age: 23
Discharge: HOME | End: 2023-02-07
Attending: EMERGENCY MEDICINE
Payer: MEDICAID

## 2023-02-07 VITALS
WEIGHT: 170 LBS | TEMPERATURE: 96.8 F | HEART RATE: 72 BPM | HEIGHT: 75 IN | RESPIRATION RATE: 16 BRPM | SYSTOLIC BLOOD PRESSURE: 150 MMHG | OXYGEN SATURATION: 100 % | DIASTOLIC BLOOD PRESSURE: 98 MMHG | BODY MASS INDEX: 21.14 KG/M2

## 2023-02-07 DIAGNOSIS — S31.119A LACERATION OF ABDOMEN, INITIAL ENCOUNTER: Primary | ICD-10-CM

## 2023-02-07 PROCEDURE — 99282 EMERGENCY DEPT VISIT SF MDM: CPT

## 2023-02-07 PROCEDURE — 12001 RPR S/N/AX/GEN/TRNK 2.5CM/<: CPT

## 2023-02-07 ASSESSMENT — PAIN DESCRIPTION - LOCATION: LOCATION: ABDOMEN

## 2023-02-07 ASSESSMENT — PAIN - FUNCTIONAL ASSESSMENT: PAIN_FUNCTIONAL_ASSESSMENT: 0-10

## 2023-02-07 ASSESSMENT — PAIN SCALES - GENERAL: PAINLEVEL_OUTOF10: 7

## 2023-02-07 NOTE — ED PROVIDER NOTES
[facility]     Emergency Department     Faculty Attestation    I performed a history and physical examination of the patient and discussed management with the resident. I reviewed the residents note and agree with the documented findings and plan of care. Any areas of disagreement are noted on the chart. I was personally present for the key portions of any procedures. I have documented in the chart those procedures where I was not present during the key portions. I have reviewed the emergency nurses triage note. I agree with the chief complaint, past medical history, past surgical history, allergies, medications, social and family history as documented unless otherwise noted below. For Physician Assistant/ Nurse Practitioner cases/documentation I have personally evaluated this patient and have completed at least one if not all key elements of the E/M (history, physical exam, and MDM). Additional findings are as noted. I have personally seen and evaluated the patient. I find the patient's history and physical exam are consistent with the NP/PA documentation. I agree with the care provided, treatment rendered, disposition and follow-up plan. Otherwise healthy 26-year-old male presenting with laceration to the left lower quadrant. Patient was trying to move a heavy coil of wire in the end of it cut his abdomen. No other injuries. No history of bleeding disorders. Exam:  General : Laying on the bed, awake, alert, and in no acute distress  CV : normal rate and regular rhythm  Lungs : Breathing comfortably on room air with no tachypnea, hypoxia, or increased work of breathing  Abdomen : Superficial laceration over the left lower quadrant. Does not extend into subcutaneous fat. Plan:  Laceration repaired primarily with tissue adhesive. Wound cleaned and probed, does not violate the peritoneum.   Follow-up with occupational health as needed    Medical Decision Making  Risk  Prescription drug management.         Kinjal Vazquez MD   Attending Emergency Physician    (Please note that portions of this note were completed with a voice recognition program. Efforts were made to edit the dictations but occasionally words are mis-transcribed.)            Kinjal Vazquez MD  02/06/23 3306

## 2023-02-07 NOTE — PROGRESS NOTES
[address] [facility] [address]  PROGRESS NOTE    Shift date: 2/6/23  Shift day: Monday   Shift # 2    Room # 23/23   Name: Kristin Hankins                Yazdanism:  [address] [place] [address] of Moravian:     Referral: Routine Visit    Admit Date & Time: 2/6/2023 10:20 PM    Assessment:  Kristin Hankins is a 25 y.o. male     Intervention:  Writer introduced self and title as . Patient appeared receptive to  visit and engaged in conversation. Patient discussed his work day and the reason for his hospital visit. Patient appeared calm and coping when recapping his day. Writer offered space for Patient  to express feelings, needs, and concerns and provided a ministry presence. Patient stated he is of [place] virgie. Outcome:  Patient thanked  for visit while processing the days events. Plan:  Chaplains will remain available to offer spiritual and emotional support as needed.       Electronically signed by Cherie Randhawa on 2/7/2023 at 12:23 AM.  [facility]  [phone]

## 2023-02-07 NOTE — ED NOTES
Pt arrives to ED 03 via triage. Pt co laceration opening up again. Pt states he was here yesterday and they glued his abdomen, but it keeps bleeding. Pt states the bleeding has not stopped since last night, so he continues to keep pressure on it. Pt respirations are even and unlabored, pt is alert and oriented X 4, speaking in complete sentences, bed is in the lowest position, call light is within reach, NAD noted. Will continue to follow plan of care.         Michelle Fung RN  02/07/23 3813

## 2023-02-07 NOTE — ED PROVIDER NOTES
[facility]  Emergency Department  Faculty Attestation     I performed a history and physical examination of the patient and discussed management with the resident. I reviewed the residents note and agree with the documented findings and plan of care. Any areas of disagreement are noted on the chart. I was personally present for the key portions of any procedures. I have documented in the chart those procedures where I was not present during the key portions. I have reviewed the emergency nurses triage note. I agree with the chief complaint, past medical history, past surgical history, allergies, medications, social and family history as documented unless otherwise noted below. For Physician Assistant/ Nurse Practitioner cases/documentation I have personally evaluated this patient and have completed at least one if not all key elements of the E/M (history, physical exam, and MDM). Additional findings are as noted. Primary Care Physician:  No primary care provider on file. Screenings:  [unfilled]    CHIEF COMPLAINT     No chief complaint on file. RECENT VITALS:   Temp: 96.8 °F (36 °C),  Heart Rate: 72, Resp: 16, BP: (!) 150/98    LABS:  Labs Reviewed - No data to display    Radiology  No orders to display       Attending Physician Additional  Notes    Patient has persistent bleeding from his left lower abdominal wound that was closed with Dermabond yesterday. He states he was cut by a coil from an object at work. No deep abdominal pain. No use of anticoagulation. No trauma since he was repaired yesterday. He was given a tetanus shot. On exam he is nontoxic afebrile slightly hypertensive. There is a hematoma over the incision just above the left iliac crest on the left lower abdomen. Abdomen is soft without peritoneal findings or tenderness.   Plan is wound cleansing, will remove Dermabond, local anesthetized and closed with either sutures or trevin. Fern Mcclellan.  Myke Daugherty MD, [facility]  Attending Emergency  Physician                Coral Hale MD  02/07/23 3939

## 2023-02-07 NOTE — ED PROVIDER NOTES
[facility]  ED  Emergency Department Encounter  Emergency Medicine Resident     Pt Kirstie Wall  MRN: [id number]  [place] 2000  Date of evaluation: 2/7/23  PCP:  No primary care provider on file. Note Started: 12:08 PM EST      CHIEF COMPLAINT       Chief Complaint   Patient presents with    Laceration     LLQ       HISTORY OF PRESENT ILLNESS  (Location/Symptom, Timing/Onset, Context/Setting, Quality, Duration, Modifying Factors, Severity.)      Javed Ramírez is a 25 y.o. male who presents for evaluation of bleeding laceration that was sustained yesterday. Patient states he was at work when a metal coil cut his left lower abdomen. Patient was evaluated at this emergency department yesterday and had Dermabond placed over the wound after it was cleaned. Since he returned home patient continues to have oozing and bleeding from this laceration. Patient denies dizziness, lightheadedness, abdominal pain. PAST MEDICAL / SURGICAL / SOCIAL / FAMILY HISTORY      has a past medical history of Seasonal allergies. has no past surgical history on file.       Social History     Socioeconomic History    Marital status: Single     Spouse name: Not on file    Number of children: Not on file    Years of education: Not on file    Highest education level: Not on file   Occupational History    Not on file   Tobacco Use    Smoking status: Not on file    Smokeless tobacco: Not on file   Substance and Sexual Activity    Alcohol use: Not on file    Drug use: Not on file    Sexual activity: Not on file   Other Topics Concern    Not on file   Social History Narrative    Not on file     Social Determinants of Health     Financial Resource Strain: Not on file   Food Insecurity: Not on file   Transportation Needs: Not on file   Physical Activity: Not on file   Stress: Not on file   Social Connections: Not on file   Intimate Partner Violence: Not on file   Housing Stability: Not on file       No family history on file. Allergies:  Clindamycin/lincomycin    Home Medications:  Prior to Admission medications    Medication Sig Start Date End Date Taking? Authorizing Provider   ibuprofen (ADVIL;MOTRIN) 800 MG tablet Take 1 tablet by mouth 2 times daily as needed for Pain 1/30/23   Marylu Saini MD         REVIEW OF SYSTEMS       Review of Systems   Respiratory:  Negative for shortness of breath. Cardiovascular:  Negative for chest pain. Skin:  Positive for wound. Neurological:  Negative for dizziness and light-headedness. Hematological:  Does not bruise/bleed easily. PHYSICAL EXAM      INITIAL VITALS:   BP (!) 150/98   Pulse 72   Temp 96.8 °F (36 °C) (Oral)   Resp 16   Ht 6' 3\" (1.905 m)   Wt 170 lb (77.1 kg)   SpO2 100%   BMI 21.25 kg/m²     Physical Exam  Vitals and nursing note reviewed. Constitutional:       Appearance: Normal appearance. HENT:      Head: Normocephalic and atraumatic. Cardiovascular:      Rate and Rhythm: Normal rate. Pulmonary:      Effort: Pulmonary effort is normal. No respiratory distress. Abdominal:      General: Abdomen is flat. Palpations: Abdomen is soft. Tenderness: There is no abdominal tenderness. Skin:     General: Skin is warm and dry. Comments: 2 cm laceration to left lower quadrant with overlying Dermabond partially peeled off with underlying hematoma, scabbing   Neurological:      General: No focal deficit present. Mental Status: He is alert and oriented to person, place, and time. DDX/DIAGNOSTIC RESULTS / EMERGENCY DEPARTMENT COURSE / MDM     Medical Decision Making  Patient with laceration to abdomen that occurred yesterday at work    Amount and/or Complexity of Data Reviewed  Discussion of management or test interpretation with external provider(s): Previous dermabond closure removed. Wound cleaned as described below.  Stapled shut without complication    Risk  Risk Details: Discussed f/u for staple removal and patient expressed understanding                   PROCEDURES:  PROCEDURE NOTE - LACERATION CLOSURE    PATIENT NAME: Lesley Zhao  MEDICAL RECORD NO. [id number]  DATE: 2/9/2023  ATTENDING PHYSICIAN: Chelsea    PREOPERATIVE DIAGNOSIS: Laceration(s) as follows:  -Location: LLQ abdomen  -Length: 2.5 cm  -Layered closure: No    POSTOPERATIVE DIAGNOSIS:  Same  PROCEDURE PERFORMED:  Suture closure of laceration  PERFORMING PHYSICIAN: Leslie Coburn DO  ANESTHESIA:  Local utilizing  Lidocaine 1% without epinephrine  ESTIMATED BLOOD LOSS:  Less than 25 ml. DISCUSSION:  Lesley Zhao is a 25y.o.-year-old male. Patient requires laceration repair. The history and physical examination were reviewed and confirmed. CONSENT: The patient provided verbal consent for this procedure. PROCEDURE:  Prior to starting, the procedure and patient were confirmed by those present. The wound area was irrigated with sterile saline and draped in a sterile fashion. The wound area was anesthetized with Lidocaine 1% without epinephrine. The wound was explored with the following results No foreign bodies found. The wound was repaired with staples. The wound was dressed with bacitracin and a bandage. All sponge, instrument and needle counts were correct at the completion of the procedure. The patient tolerated the procedure well. SUTURE COUNT:  Staple count: 4    COMPLICATIONS:  None     Leslie Coburn DO  1:26 PM, 2/7/23       CONSULTS:  None        FINAL IMPRESSION      1.  Laceration of abdomen, initial encounter          DISPOSITION / PLAN     DISPOSITION Decision To Discharge 02/07/2023 12:37:28 PM      PATIENT REFERRED TO:  [address] [place] [id number] [phone]    As needed to have staples removed    DISCHARGE MEDICATIONS:  Discharge Medication List as of 2/7/2023 12:38 PM          Leslie Coburn DO  Emergency Medicine Resident    (Please note that portions of thisnote were completed with a voice recognition program.  Efforts were made to edit the dictations but occasionally words are mis-transcribed.)      [facility] Krissy, DO  Resident  02/09/23 7320

## 2023-02-07 NOTE — DISCHARGE INSTRUCTIONS
Are seen in the emergency department today for the cut on your lower abdomen following a work injury. We repaired it with glue. Please leave the glue in place. You may shower with the glue, however do not scrub over the area. Please be sure to dry it well. Do not place Vaseline or bacitracin over the glue as it may dissolve the glue early. The glue will begin to flake off, however do not pick or pull it off. Please return to the emergency department if you develop any surrounding redness, drainage that looks like pus, worsening bleeding, or any other concerning symptoms. PLEASE RETURN TO THE EMERGENCY DEPARTMENT IMMEDIATELY if you develop any concerning symptoms such as: chest pain, shortness of breath, feeling like your heart is racing, high fever not relieved by acetaminophen (Tylenol) and/or ibuprofen (Motrin / Advil), chills, persistent nausea and/or vomiting, loss of consciousness, numbness, weakness or tingling in the arms or legs or change in color of the extremities, changes in mental status, persistent or severe headache, blurry vision, loss of bladder / bowel control, unable to follow up with your physician, or other any other care or concern.

## 2023-02-07 NOTE — ED PROVIDER NOTES
[facility]  ED  Emergency Department Encounter  Emergency Medicine Resident     Pt Aazm Tay  MRN: [id number]  [place] 2000  Date of evaluation: 2/6/23  PCP:  No primary care provider on file. Note Started: 10:31 PM EST      CHIEF COMPLAINT       Chief Complaint   Patient presents with    Laceration     Left lower abdomen       HISTORY OF PRESENT ILLNESS  (Location/Symptom, Timing/Onset, Context/Setting, Quality, Duration, Modifying Factors, Severity.)      Silke Matthews is a 25 y.o. male who presents with laceration to the left lower abdomen. Patient states this happened approximately 20 minutes prior to arrival.  He was at work working with a large metal coil when it slipped from his hand and cut his lower abdomen. Bleeding is well controlled at this time. He states he has 8 out of 10 pain. Patient is unsure if he is up-to-date on his Tdap. PAST MEDICAL / SURGICAL / SOCIAL / FAMILY HISTORY      has a past medical history of Seasonal allergies. has no past surgical history on file. Social History     Socioeconomic History    Marital status: Single     Spouse name: Not on file    Number of children: Not on file    Years of education: Not on file    Highest education level: Not on file   Occupational History    Not on file   Tobacco Use    Smoking status: Not on file    Smokeless tobacco: Not on file   Substance and Sexual Activity    Alcohol use: Not on file    Drug use: Not on file    Sexual activity: Not on file   Other Topics Concern    Not on file   Social History Narrative    Not on file     Social Determinants of Health     Financial Resource Strain: Not on file   Food Insecurity: Not on file   Transportation Needs: Not on file   Physical Activity: Not on file   Stress: Not on file   Social Connections: Not on file   Intimate Partner Violence: Not on file   Housing Stability: Not on file       No family history on file.     Allergies: Clindamycin/lincomycin    Home Medications:  Prior to Admission medications    Medication Sig Start Date End Date Taking? Authorizing Provider   ibuprofen (ADVIL;MOTRIN) 800 MG tablet Take 1 tablet by mouth 2 times daily as needed for Pain 1/30/23   Marylu Enriquez MD       REVIEW OF SYSTEMS       Review of Systems   Gastrointestinal:  Positive for abdominal pain. Negative for nausea and vomiting. Pain around laceration but no pain in abdomen. Musculoskeletal:  Negative for back pain and joint swelling. Skin:  Positive for wound. PHYSICAL EXAM      INITIAL VITALS:   BP (!) 147/87   Pulse 85   Temp 98.2 °F (36.8 °C) (Oral)   Resp 18   Ht 6' 3\" (1.905 m)   Wt 175 lb (79.4 kg)   SpO2 99%   BMI 21.87 kg/m²     Physical Exam  Constitutional:       Appearance: Normal appearance. HENT:      Head: Normocephalic and atraumatic. Nose: No congestion. Mouth/Throat:      Mouth: Mucous membranes are moist.   Eyes:      Conjunctiva/sclera: Conjunctivae normal.      Pupils: Pupils are equal, round, and reactive to light. Pulmonary:      Effort: Pulmonary effort is normal. No respiratory distress. Abdominal:      General: Abdomen is flat. Palpations: Abdomen is soft. Tenderness: There is no abdominal tenderness. Comments: 4 cm laceration to the right lower quadrant. Musculoskeletal:      Cervical back: Normal range of motion. Skin:     General: Skin is warm and dry. Neurological:      Mental Status: He is alert and oriented to person, place, and time. Psychiatric:         Mood and Affect: Mood normal.         Behavior: Behavior normal.         Judgment: Judgment normal.         DDX/DIAGNOSTIC RESULTS / EMERGENCY DEPARTMENT COURSE / MDM     Medical Decision Making  78-year-old male who presents with a laceration to the left lower quadrant of the abdomen. Patient is mildly hypertensive, otherwise vital signs are stable.   There is a 3 to 4 cm superficial linear laceration to the right lower quadrant. Bleeding is well controlled at this time. Patient is unsure when his last tetanus shot was. Plan for Toradol for pain and update patient's tetanus shot. Will repair laceration with Dermabond. Anticipate discharge. Amount and/or Complexity of Data Reviewed  External Data Reviewed: notes. Risk  Prescription drug management. Critical Care  Total time providing critical care: < 30 minutes      EMERGENCY DEPARTMENT COURSE:  Laceration was repaired, patient tolerated well without difficulty. Patient was updated on his tetanus vaccine. Patient awaiting occupational health at this time. We will discharge the patient for him to await occupational health in the waiting room. Discussed follow-up and return precautions with the patient. Patient verbalized understanding all questions were answered. PROCEDURES:  PROCEDURE NOTE - LACERATION CLOSURE    PATIENT NAME: Althea Iglesias RECORD NO. [id number]  DATE: 2/6/2023  ATTENDING PHYSICIAN: Dr. Kyaw Swift DIAGNOSIS: Laceration(s) as follows:  -Location: Left lower quadrant  -Length: 3.5 cm  -Layered closure: No    POSTOPERATIVE DIAGNOSIS:  Same  PROCEDURE PERFORMED:  Suture closure of laceration  PERFORMING PHYSICIAN: Oumou Ramirez DO  ANESTHESIA:  Local utilizing  not required  ESTIMATED BLOOD LOSS:  Less than 25 ml. DISCUSSION:  Zhane Curtis is a 25y.o.-year-old male. Patient requires laceration repair. The history and physical examination were reviewed and confirmed. CONSENT: The patient provided verbal consent for this procedure. PROCEDURE:  Prior to starting, the procedure and patient were confirmed by those present. The wound area was irrigated with sterile saline and draped in a sterile fashion. The wound area was anesthetized with not required. The wound was explored with the following results No foreign bodies found. The wound was repaired with Dermabond.   The wound was dressed with a bandage. All sponge, instrument and needle counts were correct at the completion of the procedure. The patient tolerated the procedure well. SUTURE COUNT:  None    COMPLICATIONS:  None     Micheal Holley DO  11:44 PM, 2/6/23       CONSULTS:  None    CRITICAL CARE:  There was significant risk of life threatening deterioration of patient's condition requiring my direct management. Critical care time 0 minutes, excluding any documented procedures. FINAL IMPRESSION      1.  Laceration of abdomen, initial encounter          DISPOSITION / PLAN     DISPOSITION Decision To Discharge 02/06/2023 11:32:57 PM      PATIENT REFERRED TO:  [address] [place], [place] [id number] [phone]    For post-ER visit assessment and to establish care    DISCHARGE MEDICATIONS:  Discharge Medication List as of 2/6/2023 11:33 PM          Micheal Holley DO  Emergency Medicine Resident    (Please note that portions of thisnote were completed with a voice recognition program.  Efforts were made to edit the dictations but occasionally words are mis-transcribed.)       Micheal Holley DO  Resident  02/06/23 6023

## 2023-02-07 NOTE — ED NOTES
Patient here from work c/o laceration to abdomen after work injury. Patient states he was lifting a metal coil off of a machine when it slipped and caught him on his left lower abdomen. Patient denies any other injuries. Patient arrives with laceration dressed in gauze and co-band wrap. Minimal bleeding to approx 1-1.5inch laceration. On arrival, patient ambulatory, A+Ox4, respirations even and unlabored, speaking in complete sentences. Patient unsure of when last tetanus booster was received.       Shahbaz Carmona, RN  02/06/23 [id number]

## 2023-02-07 NOTE — Clinical Note
Lesley Zhao was seen and treated in our emergency department on 2/7/2023. He may return to work on . Please allow Clark Neri to be on light duty for the next 3 days so that his wound can heal.  He needs to be doing work with minimal movement     If you have any questions or concerns, please don't hesitate to call.       Leslie Coburn, DO

## 2023-02-07 NOTE — Clinical Note
Kristin Shoulders was seen and treated in our emergency department on 2/7/2023. He may return to work on . Please allow Cassie Driver to be on light duty for the next 3 days so that his wound can heal.  He needs to be doing work with minimal movement     If you have any questions or concerns, please don't hesitate to call.       Sylvia Brunner, DO

## 2023-02-07 NOTE — DISCHARGE INSTRUCTIONS
You were seen for evaluation of bleeding laceration on your abdomen. The Dermabond was removed from the wound and the area was cleaned with sterile water. The area was then numbed with lidocaine and you received 4 staples in your abdomen. Bleeding was controlled after staples were applied. Bacitracin and a Band-Aid were put over the wound. If the wound begins bleeding again at home you should apply pressure. If you cannot control the bleeding at home you should return to the emergency department immediately. Please keep this area clean and dry and change the bandage daily. Return the emergency department for any erythema, thick drainage, fevers, chills, syncope or loss of consciousness. Otherwise you need to follow-up in the next 7 to 10 days to have your staples removed. You can follow-up at your primary care office, if you do not have 1 there is one included in this paperwork.   You can also return to the ED to have the staples removed

## 2023-02-07 NOTE — ED NOTES
Occupational health contacted and at bedside to take patient for required testing     Shahbaz Carmona RN  02/06/23 [id number]

## 2023-02-09 ASSESSMENT — ENCOUNTER SYMPTOMS: SHORTNESS OF BREATH: 0

## 2024-07-08 ENCOUNTER — HOSPITAL ENCOUNTER (EMERGENCY)
Age: 24
Discharge: HOME | End: 2024-07-08

## 2025-04-28 ENCOUNTER — OFFICE VISIT (OUTPATIENT)
Age: 25
End: 2025-04-28

## 2025-04-28 VITALS
HEART RATE: 92 BPM | DIASTOLIC BLOOD PRESSURE: 85 MMHG | OXYGEN SATURATION: 96 % | BODY MASS INDEX: 28.01 KG/M2 | WEIGHT: 230 LBS | RESPIRATION RATE: 18 BRPM | SYSTOLIC BLOOD PRESSURE: 133 MMHG | TEMPERATURE: 98.5 F | HEIGHT: 76 IN

## 2025-04-28 DIAGNOSIS — R03.0 ELEVATED BLOOD PRESSURE READING: ICD-10-CM

## 2025-04-28 DIAGNOSIS — J02.9 SORE THROAT: Primary | ICD-10-CM

## 2025-04-28 LAB — S PYO AG THROAT QL: NORMAL

## 2025-04-28 RX ORDER — IBUPROFEN 800 MG/1
800 TABLET, FILM COATED ORAL 2 TIMES DAILY PRN
Qty: 60 TABLET | Refills: 0 | Status: SHIPPED | OUTPATIENT
Start: 2025-04-28

## 2025-04-28 ASSESSMENT — ENCOUNTER SYMPTOMS
DIARRHEA: 0
VOMITING: 0
NAUSEA: 0
COUGH: 1
SORE THROAT: 1

## 2025-04-29 NOTE — PROGRESS NOTES
[facility], [facility] ([facility])  [facility] [place] [address]  Dept: [phone]    Date: 25    Kobe Zavala (:  2000) is a 24 y.o. male, here for evaluation of the following chief complaint(s):  Pharyngitis      HPI  24 y.o. male presents with a sore throat for two days.      History provided by:  Patient  Pharyngitis  Associated symptoms: cough and sore throat    Associated symptoms: no chest pain, no diarrhea, no ear pain, no fatigue, no fever, no nausea and no vomiting         ROS  Review of Systems   Constitutional:  Negative for chills, fatigue and fever.   HENT:  Positive for sore throat. Negative for ear pain.    Respiratory:  Positive for cough.    Cardiovascular:  Negative for chest pain and palpitations.   Gastrointestinal:  Negative for diarrhea, nausea and vomiting.       PHYSICAL EXAM  Vitals:    25 2128 25 2130   BP: (!) 144/85 133/85   Pulse: 92    Resp: 18    Temp: 98.5 °F (36.9 °C)    TempSrc: Oral    SpO2: 96%    Weight: 104.3 kg (230 lb)    Height: 1.93 m (6' 4\")      Physical Exam  Constitutional:       General: He is not in acute distress.     Appearance: Normal appearance.   HENT:      Nose: Nose normal.      Mouth/Throat:      Mouth: Mucous membranes are moist.      Pharynx: Uvula midline. Posterior oropharyngeal erythema present. No oropharyngeal exudate or uvula swelling.      Tonsils: No tonsillar exudate or tonsillar abscesses.   Eyes:      Conjunctiva/sclera: Conjunctivae normal.   Cardiovascular:      Rate and Rhythm: Normal rate.      Pulses: Normal pulses.      Heart sounds: Normal heart sounds.   Pulmonary:      Effort: Pulmonary effort is normal.      Breath sounds: Normal breath sounds.   Musculoskeletal:      Cervical back: Tenderness present. No rigidity.   Lymphadenopathy:      Cervical: Cervical adenopathy present.   Skin:     General: Skin is warm and dry.   Neurological:      Mental Status: He is

## 2025-06-03 ENCOUNTER — TELEPHONE (OUTPATIENT)
Dept: FAMILY MEDICINE CLINIC | Age: 25
End: 2025-06-03

## 2025-06-23 ENCOUNTER — OFFICE VISIT (OUTPATIENT)
Age: 25
End: 2025-06-23

## 2025-06-23 VITALS
HEIGHT: 76 IN | WEIGHT: 220 LBS | SYSTOLIC BLOOD PRESSURE: 166 MMHG | HEART RATE: 94 BPM | BODY MASS INDEX: 26.79 KG/M2 | OXYGEN SATURATION: 98 % | DIASTOLIC BLOOD PRESSURE: 99 MMHG | TEMPERATURE: 97.4 F | RESPIRATION RATE: 18 BRPM

## 2025-06-23 DIAGNOSIS — S53.401A SPRAIN OF RIGHT UPPER ARM, INITIAL ENCOUNTER: Primary | ICD-10-CM

## 2025-06-23 DIAGNOSIS — R03.0 ELEVATED BLOOD PRESSURE READING: ICD-10-CM

## 2025-06-23 DIAGNOSIS — I10 PRIMARY HYPERTENSION: ICD-10-CM

## 2025-06-23 RX ORDER — LISINOPRIL 10 MG/1
10 TABLET ORAL DAILY
Qty: 30 TABLET | Refills: 0 | Status: SHIPPED | OUTPATIENT
Start: 2025-06-23 | End: 2025-07-23

## 2025-06-23 ASSESSMENT — ENCOUNTER SYMPTOMS
RESPIRATORY NEGATIVE: 1
EYES NEGATIVE: 1
SORE THROAT: 0
RHINORRHEA: 0
ALLERGIC/IMMUNOLOGIC NEGATIVE: 1
ABDOMINAL PAIN: 0

## 2025-06-23 NOTE — PROGRESS NOTES
Kobe Zavala (:  2000) is a 25 y.o. male,{New vs Established:[id number]}, here for evaluation of the following chief complaint(s):  Arm Pain (Elbow injury, weakness in right arm. )      Assessment & Plan :  Visit Diagnoses and Associated Orders         Sprain of right upper arm, initial encounter    -  Primary           Elevated blood pressure reading        Ambulatory referral to Internal Medicine [REF40 Custom]      lisinopril (PRINIVIL;ZESTRIL) 10 MG tablet [70614]                    Follow up in *** days if symptoms persist or if symptoms worsen.       Subjective :  HPI     25 y.o. male presents with symptoms of ***         Vitals:    25 1911 25 191   BP: (!) 162/92 (!) 166/99   BP Site: Left Upper Arm Left Upper Arm   Patient Position: Sitting Sitting   BP Cuff Size: Medium Adult Medium Adult   Pulse: 79 94   Resp: 18    Temp: 97.4 °F (36.3 °C)    TempSrc: Temporal    SpO2: 98%    Weight: 99.8 kg (220 lb)    Height: 1.93 m (6' 4\")        No results found for this visit on 25.      Objective   Physical Exam          An electronic signature was used to authenticate this note.    Agustina Deleon, APRN - CNP

## 2025-06-23 NOTE — PATIENT INSTRUCTIONS
.    DASH Diet: After Your Visit  Your Care Instructions  The DASH diet is an eating plan that can help lower your blood pressure. DASH stands for Dietary Approaches to Stop Hypertension. Hypertension is high blood pressure.  The DASH diet focuses on eating foods that are high in calcium, potassium, and magnesium. These nutrients can lower blood pressure. The foods that are highest in these nutrients are fruits, vegetables, low-fat dairy products, nuts, seeds, and legumes. But taking calcium, potassium, and magnesium supplements instead of eating foods that are high in those nutrients does not have the same effect. The DASH diet also includes whole grains, fish, and poultry.  The DASH diet is one of several lifestyle changes your doctor may recommend to lower your high blood pressure. Your doctor may also want you to decrease the amount of sodium in your diet. Lowering sodium while following the DASH diet can lower blood pressure even further than just the DASH diet alone.  Follow-up care is a key part of your treatment and safety. Be sure to make and go to all appointments, and call your doctor if you are having problems. It’s also a good idea to know your test results and keep a list of the medicines you take.  How can you care for yourself at home?  Following the DASH diet  Eat 4 to 5 servings of fruit each day. A serving is 1 medium-sized piece of fruit, ½ cup chopped or canned fruit, 1/4 cup dried fruit, or 4 ounces (½ cup) of fruit juice. Choose fruit more often than fruit juice.  Eat 4 to 5 servings of vegetables each day. A serving is 1 cup of lettuce or raw leafy vegetables, ½ cup of chopped or cooked vegetables, or 4 ounces (½ cup) of vegetable juice. Choose vegetables more often than vegetable juice.  Get 2 to 3 servings of low-fat and fat-free dairy each day. A serving is 8 ounces of milk, 1 cup of yogurt, or 1 ½ ounces of cheese.  Eat 7 to 8 servings of grains each day. A serving is 1 slice of bread, 1

## 2025-06-24 NOTE — PROGRESS NOTES
Kobe Zavala (: 2000) is a 25 y.o. male, New patient, here for evaluation of the following complaint(s): Arm Pain (Elbow injury, weakness in right arm. )        History provided by:  Patient   used: No    Arm Pain   The incident occurred 2 days ago. The incident occurred at work ([employer], not [employer] The injury mechanism was repetitive motion. The pain is present in the upper right arm. The quality of the pain is described as aching. The pain does not radiate. The pain is at a severity of 4/10. The pain is mild. The pain has been Fluctuating since the incident. Associated symptoms include muscle weakness. Pertinent negatives include no chest pain, numbness or tingling. The symptoms are aggravated by movement. He has tried nothing for the symptoms.        PAST MEDICAL HISTORY    Past Medical History:   Diagnosis Date    Seasonal allergies        SURGICAL HISTORY    No past surgical history on file.    CURRENT MEDICATIONS    Current Outpatient Rx   Medication Sig Dispense Refill    lisinopril (PRINIVIL;ZESTRIL) 10 MG tablet Take 1 tablet by mouth daily 30 tablet 0    ibuprofen (ADVIL;MOTRIN) 800 MG tablet Take 1 tablet by mouth 2 times daily as needed for Pain (Patient not taking: Reported on 2025) 60 tablet 0    ibuprofen (ADVIL;MOTRIN) 800 MG tablet Take 1 tablet by mouth 2 times daily as needed for Pain (Patient not taking: Reported on 2025) 15 tablet 0       ALLERGIES    Allergies   Allergen Reactions    Clindamycin/Lincomycin Shortness Of Breath       FAMILY HISTORY    No family history on file.    SOCIAL HISTORY    Social History     Socioeconomic History    Marital status: Single     Social Drivers of Health     Food Insecurity: No Food Insecurity (2023)    Received from [employer], [facility] [employer] [employer]    Hunger Screening     Within the past 12 months we worried whether our food would run out before we got money to buy more.: Never True     Within the